# Patient Record
Sex: MALE | Race: WHITE | NOT HISPANIC OR LATINO
[De-identification: names, ages, dates, MRNs, and addresses within clinical notes are randomized per-mention and may not be internally consistent; named-entity substitution may affect disease eponyms.]

---

## 2019-07-10 ENCOUNTER — RX RENEWAL (OUTPATIENT)
Age: 49
End: 2019-07-10

## 2019-07-10 DIAGNOSIS — G47.00 INSOMNIA, UNSPECIFIED: ICD-10-CM

## 2019-07-10 PROBLEM — Z00.00 ENCOUNTER FOR PREVENTIVE HEALTH EXAMINATION: Status: ACTIVE | Noted: 2019-07-10

## 2019-09-09 ENCOUNTER — RX RENEWAL (OUTPATIENT)
Age: 49
End: 2019-09-09

## 2020-05-12 RX ORDER — CHLOROQUINE PHOSPHATE 500 MG/1
500 TABLET ORAL
Qty: 20 | Refills: 0 | Status: DISCONTINUED | COMMUNITY
Start: 2020-03-18 | End: 2020-05-12

## 2020-05-13 ENCOUNTER — APPOINTMENT (OUTPATIENT)
Dept: ORTHOPEDIC SURGERY | Facility: CLINIC | Age: 50
End: 2020-05-13
Payer: COMMERCIAL

## 2020-05-13 PROCEDURE — 76882 US LMTD JT/FCL EVL NVASC XTR: CPT | Mod: LT,59

## 2020-05-13 PROCEDURE — 20611 DRAIN/INJ JOINT/BURSA W/US: CPT | Mod: LT

## 2020-05-13 PROCEDURE — 99204 OFFICE O/P NEW MOD 45 MIN: CPT | Mod: 25

## 2020-05-13 NOTE — PHYSICAL EXAM
[de-identified] : PHYSICAL EXAM LEFT  SHOULDER\par \par MILD SCAPULAR PROTRACTION\par AROM 140 / 140 / 80 / 20 \par TENDER: SA REGION LATERALLY \par \par SPECIAL TESTING :\par SOTO - POSITIVE \par GILBERTO - POSITIVE \par SPEED TEST - POSITIVE\par \par RAM - NEGATIVE \par APPREHENSION AND SUPPRESSION - NEGATIVE \par \par RC STRENGTH TESTING \par SS:  5/5\par SUB 5/5\par IS     5/5\par BICEPS  5/5\par \par SENSATION  - GROSSLY INTACT\par \par \par \par  [de-identified] : LEFT SHOULDER XRAY (2 VIEWS - AP AND OUTLET) -  \par NO OBVIOUS FRACTURE , SEPARATION OR DISLOCATION \par NO SIGNIFICANT OSTEOARTHRITIS, \par TYPE 2B LOWLYING ACROMION \par CSA= 33.5

## 2020-05-13 NOTE — DISCUSSION/SUMMARY
[de-identified] : POST INJECTION INSTRUCTIONS\par \par COLD THERAPY , ANALGESICS PRN\par \par HOME STRETCHING AND EXERCISES QD\par \par START P.T.  2 WEEKS AFTER INJECTION \par \par MRI IF NO RELIEF\par

## 2020-05-13 NOTE — HISTORY OF PRESENT ILLNESS
[de-identified] : LEFT SHOULDER\par MORE THEN 6 MONTHS \par PAIN LEVEL 7/10\par INTERMITTENT PAIN\par TWO PREVIOUS CORTISONE INJECTIONS HELPFUL\par MOST RECENT JANUARY 2020 \par NO PT YET

## 2020-05-13 NOTE — PROCEDURE
[de-identified] : DIAGNOSTIC ULTRASOUND LEFT SHOULDER\par \par DIAGNOSTIC SONOGRAPHY of the Rotator Cuff Soft Tissue of the LEFT  SHOULDER was performed in Multiple Scan Planes with varying transducer frequencies.\par Imaging of the Supraspinatus Tendon reveals TENDONITIS, BURSITIS, ARTICULAR SIDE DEGENERATION  WITH OUT SIGNIFICANT / COMPLETE TEAR\par Imaging of the Biceps Tendon reveals no significant tear.\par Imaging of the Subscapularis Tendon reveals no significant tear.\par Imaging of the Infraspinatus Tendon reveals no significant tear.\par Key images were save digitally and reviewed with patient.\par \par \par INJECTION LEFT SHOULDER SA SPACE\par \par Patient has demonstrated limited relief from NSAIDS, rest, exercises / PT, and after discussion of the risks and benefits, the patient has elected to proceed with an ULTRASOUND GUIDED injection into the LEFT SUBACROMIAL  SPACE LATERAL APPROACH \par  \par Confirmed that the patient does not have history of prior adverse reactions, active, infections, or relevant allergies. There was no effusion, erythema, or warmth, and the skin was clear\par \par The skin was sterilized with alcohol. Ethyl Chloride was used as a topical anesthetic. Routine sterile technique. \par The site was injected UTILIZING ULTRASOUND GUIDANCE to confirm appropriate placement of the needle-\par with a mixture of medication and local anesthetic. The injection was completed without complication and a bandage was applied.\par  \par The patient tolerated the procedure well and was given post-injection instructions.Rec: Cold therapy, analgesics, avoid heavy activity.\par MEDICATION: 4cc of 1% xylocaine + 40mg of KENALOG\par \par

## 2020-10-01 ENCOUNTER — APPOINTMENT (OUTPATIENT)
Dept: ORTHOPEDIC SURGERY | Facility: CLINIC | Age: 50
End: 2020-10-01
Payer: COMMERCIAL

## 2020-10-01 DIAGNOSIS — M75.112 INCOMPLETE ROTATOR CUFF TEAR OR RUPTURE OF LEFT SHOULDER, NOT SPECIFIED AS TRAUMATIC: ICD-10-CM

## 2020-10-01 PROCEDURE — 99214 OFFICE O/P EST MOD 30 MIN: CPT | Mod: 25

## 2020-10-01 PROCEDURE — 20611 DRAIN/INJ JOINT/BURSA W/US: CPT | Mod: LT

## 2020-10-01 NOTE — PHYSICAL EXAM
[de-identified] : PHYSICAL EXAM LEFT  SHOULDER\par \par MILD SCAPULAR PROTRACTION\par AROM 140 / 140 / 80 / 20 \par TENDER: SA REGION LATERALLY \par \par SPECIAL TESTING :\par SOTO - POSITIVE \par GILBERTO - POSITIVE \par SPEED TEST - POSITIVE\par \par RAM - NEGATIVE \par APPREHENSION AND SUPPRESSION - NEGATIVE \par \par RC STRENGTH TESTING \par SS:  5/5\par SUB 5/5\par IS     5/5\par BICEPS  5/5\par \par SENSATION  - GROSSLY INTACT\par \par \par \par

## 2020-10-01 NOTE — DISCUSSION/SUMMARY
[de-identified] : POST INJECTION INSTRUCTIONS\par \par COLD THERAPY ,TYLENOL  PRN - AVOID NSAIDS 14 DAYS \par \par HOME STRETCHING AND EXERCISES QD\par \par START P.T.  2 WEEKS AFTER INJECTION \par \par NO GYM 2 WEEKS \par \par \par

## 2020-10-01 NOTE — PROCEDURE
[de-identified] : INJECTION LEFT SHOULDER PARTIAL ROT CUFF TEAR \par \par Patient has demonstrated limited relief from NSAIDS, rest, exercises / PT, and after discussion of the risks and benefits, the patient has elected to proceed with an ULTRASOUND GUIDED injection into the LEFT SHOULDER PARTIAL ROT CUFF TEAR  \par  \par Confirmed that the patient does not have history of prior adverse reactions, active, infections, or relevant allergies. There was no effusion, erythema, or warmth, and the skin was clear\par \par The skin was sterilized with alcohol. Ethyl Chloride was used as a topical anesthetic. Routine sterile technique. \par The site was injected UTILIZING ULTRASOUND GUIDANCE to confirm appropriate placement of the needle IN AND AROUND THE PARTIAL ROTATOR CUFF TEAR -with ARTHREX ISIDRO PRP 5CC. The injection was completed without complication and a bandage was applied.\par  \par The patient tolerated the procedure well and was given post-injection instructions.Rec: Cold therapy, analgesics, avoid heavy activity.\par MEDICATION :ARTHREX ISIDRO PRP 5CC\par \par

## 2020-10-01 NOTE — HISTORY OF PRESENT ILLNESS
[de-identified] : LEFT SHOULDER\par FOLLOW UP\par PAIN LEVEL 8/10\par PREVIOUS CORTISONE INJECTION AUGUST 16  HELPFUL FOR A FEW WEEKS\par USING TYLENOL (NO NSAIDS) FOR PAIN SINCE LAST WEDNESDAY

## 2020-12-01 RX ORDER — HYDROMORPHONE HYDROCHLORIDE 4 MG/1
4 TABLET ORAL
Qty: 28 | Refills: 0 | Status: DISCONTINUED | COMMUNITY
Start: 2020-11-29 | End: 2020-12-01

## 2021-01-14 ENCOUNTER — TRANSCRIPTION ENCOUNTER (OUTPATIENT)
Age: 51
End: 2021-01-14

## 2021-02-21 RX ORDER — HYDROMORPHONE HYDROCHLORIDE 2 MG/1
2 TABLET ORAL EVERY 4 HOURS
Qty: 50 | Refills: 0 | Status: DISCONTINUED | COMMUNITY
Start: 2020-12-01 | End: 2021-02-21

## 2021-02-21 RX ORDER — GABAPENTIN 300 MG/1
300 CAPSULE ORAL 3 TIMES DAILY
Qty: 45 | Refills: 2 | Status: DISCONTINUED | COMMUNITY
Start: 2020-12-20 | End: 2021-02-21

## 2021-02-21 RX ORDER — KETOROLAC TROMETHAMINE 10 MG/1
10 TABLET, FILM COATED ORAL EVERY 6 HOURS
Qty: 20 | Refills: 0 | Status: DISCONTINUED | COMMUNITY
Start: 2020-12-20 | End: 2021-02-21

## 2021-02-21 RX ORDER — TRAMADOL HYDROCHLORIDE 50 MG/1
50 TABLET, COATED ORAL
Qty: 90 | Refills: 0 | Status: DISCONTINUED | COMMUNITY
Start: 2020-11-25 | End: 2021-02-21

## 2021-08-26 RX ORDER — MUPIROCIN 20 MG/G
2 OINTMENT TOPICAL TWICE DAILY
Qty: 1 | Refills: 3 | Status: DISCONTINUED | COMMUNITY
Start: 2021-03-22 | End: 2021-08-26

## 2021-08-26 RX ORDER — TRAMADOL HYDROCHLORIDE 50 MG/1
50 TABLET, COATED ORAL
Qty: 60 | Refills: 0 | Status: DISCONTINUED | COMMUNITY
Start: 2021-03-09 | End: 2021-08-26

## 2021-08-26 RX ORDER — PRAMOXINE HYDROCHLORIDE AND HYDROCORTISONE ACETATE 10; 25 MG/G; MG/G
2.5-1 CREAM TOPICAL
Qty: 1 | Refills: 5 | Status: DISCONTINUED | COMMUNITY
Start: 2019-07-10 | End: 2021-08-26

## 2021-08-26 RX ORDER — GENTAMICIN SULFATE 1 MG/G
0.1 CREAM TOPICAL TWICE DAILY
Qty: 1 | Refills: 5 | Status: DISCONTINUED | COMMUNITY
Start: 2019-07-10 | End: 2021-08-26

## 2021-09-24 RX ORDER — IBUPROFEN AND FAMOTIDINE 800; 26.6 MG/1; MG/1
800-26.6 TABLET, COATED ORAL
Qty: 90 | Refills: 3 | Status: DISCONTINUED | COMMUNITY
Start: 2020-10-19 | End: 2021-09-24

## 2021-09-24 RX ORDER — IBUPROFEN 800 MG/1
800 TABLET ORAL 4 TIMES DAILY
Qty: 120 | Refills: 3 | Status: DISCONTINUED | COMMUNITY
Start: 2020-05-12 | End: 2021-09-24

## 2021-10-25 DIAGNOSIS — Z87.09 PERSONAL HISTORY OF OTHER DISEASES OF THE RESPIRATORY SYSTEM: ICD-10-CM

## 2021-10-25 RX ORDER — ALPRAZOLAM 0.5 MG/1
0.5 TABLET ORAL 3 TIMES DAILY
Qty: 45 | Refills: 0 | Status: DISCONTINUED | COMMUNITY
Start: 2021-10-12 | End: 2021-10-25

## 2021-10-27 ENCOUNTER — APPOINTMENT (OUTPATIENT)
Dept: HEART AND VASCULAR | Facility: CLINIC | Age: 51
End: 2021-10-27
Payer: COMMERCIAL

## 2021-10-27 VITALS
BODY MASS INDEX: 28.98 KG/M2 | DIASTOLIC BLOOD PRESSURE: 60 MMHG | SYSTOLIC BLOOD PRESSURE: 129 MMHG | HEART RATE: 72 BPM | WEIGHT: 207 LBS | HEIGHT: 71 IN | OXYGEN SATURATION: 95 %

## 2021-10-27 DIAGNOSIS — R00.2 PALPITATIONS: ICD-10-CM

## 2021-10-27 PROCEDURE — 99214 OFFICE O/P EST MOD 30 MIN: CPT

## 2021-10-27 PROCEDURE — 93000 ELECTROCARDIOGRAM COMPLETE: CPT

## 2021-11-08 RX ORDER — LORAZEPAM 1 MG/1
1 TABLET ORAL 3 TIMES DAILY
Qty: 45 | Refills: 0 | Status: DISCONTINUED | COMMUNITY
Start: 2021-10-25 | End: 2021-11-08

## 2022-01-18 ENCOUNTER — FORM ENCOUNTER (OUTPATIENT)
Age: 52
End: 2022-01-18

## 2022-01-19 ENCOUNTER — OUTPATIENT (OUTPATIENT)
Dept: OUTPATIENT SERVICES | Facility: HOSPITAL | Age: 52
LOS: 1 days | End: 2022-01-19
Payer: COMMERCIAL

## 2022-01-19 DIAGNOSIS — R00.2 PALPITATIONS: ICD-10-CM

## 2022-01-19 PROCEDURE — 93306 TTE W/DOPPLER COMPLETE: CPT

## 2022-01-19 PROCEDURE — 93306 TTE W/DOPPLER COMPLETE: CPT | Mod: 26

## 2022-01-31 ENCOUNTER — RX RENEWAL (OUTPATIENT)
Age: 52
End: 2022-01-31

## 2023-04-27 RX ORDER — ZOLPIDEM TARTRATE 10 MG/1
10 TABLET ORAL
Qty: 30 | Refills: 0 | Status: DISCONTINUED | COMMUNITY
Start: 2019-07-10 | End: 2023-04-27

## 2023-04-27 RX ORDER — OXYCODONE AND ACETAMINOPHEN 7.5; 325 MG/1; MG/1
7.5-325 TABLET ORAL
Qty: 42 | Refills: 0 | Status: DISCONTINUED | COMMUNITY
Start: 2022-09-09 | End: 2023-04-27

## 2023-04-27 RX ORDER — ZOLPIDEM TARTRATE 10 MG/1
10 TABLET ORAL
Qty: 30 | Refills: 0 | Status: DISCONTINUED | COMMUNITY
Start: 2021-01-11 | End: 2023-04-27

## 2023-04-27 RX ORDER — SEMAGLUTIDE 1.34 MG/ML
2 INJECTION, SOLUTION SUBCUTANEOUS
Qty: 1 | Refills: 0 | Status: DISCONTINUED | COMMUNITY
Start: 2022-06-11 | End: 2023-04-27

## 2023-04-27 RX ORDER — FINASTERIDE 1 MG/1
1 TABLET ORAL
Qty: 30 | Refills: 5 | Status: DISCONTINUED | COMMUNITY
Start: 2022-01-08 | End: 2023-04-27

## 2023-04-27 RX ORDER — ZOLPIDEM TARTRATE 10 MG/1
10 TABLET ORAL
Qty: 30 | Refills: 0 | Status: DISCONTINUED | COMMUNITY
Start: 2021-08-26 | End: 2023-04-27

## 2023-04-27 RX ORDER — ZOLPIDEM TARTRATE 10 MG/1
10 TABLET ORAL
Qty: 30 | Refills: 0 | Status: DISCONTINUED | COMMUNITY
Start: 2020-02-08 | End: 2023-04-27

## 2023-04-27 RX ORDER — AMLODIPINE BESYLATE 10 MG/1
10 TABLET ORAL DAILY
Qty: 90 | Refills: 3 | Status: DISCONTINUED | COMMUNITY
Start: 2021-11-01 | End: 2023-04-27

## 2023-04-27 RX ORDER — AMLODIPINE AND VALSARTAN 5; 320 MG/1; MG/1
5-320 TABLET, FILM COATED ORAL DAILY
Qty: 30 | Refills: 2 | Status: DISCONTINUED | COMMUNITY
Start: 2021-07-08 | End: 2023-04-27

## 2023-04-27 RX ORDER — AZITHROMYCIN 250 MG/1
250 TABLET, FILM COATED ORAL
Qty: 1 | Refills: 0 | Status: DISCONTINUED | COMMUNITY
Start: 2022-11-03 | End: 2023-04-27

## 2023-06-12 DIAGNOSIS — Z78.9 OTHER SPECIFIED HEALTH STATUS: ICD-10-CM

## 2023-06-12 DIAGNOSIS — M75.42 IMPINGEMENT SYNDROME OF LEFT SHOULDER: ICD-10-CM

## 2023-06-12 DIAGNOSIS — M75.82 OTHER SHOULDER LESIONS, LEFT SHOULDER: ICD-10-CM

## 2023-06-12 DIAGNOSIS — M75.81 OTHER SHOULDER LESIONS, RIGHT SHOULDER: ICD-10-CM

## 2023-09-05 ENCOUNTER — NON-APPOINTMENT (OUTPATIENT)
Age: 53
End: 2023-09-05

## 2023-09-05 ENCOUNTER — APPOINTMENT (OUTPATIENT)
Dept: ORTHOPEDIC SURGERY | Facility: CLINIC | Age: 53
End: 2023-09-05
Payer: COMMERCIAL

## 2023-09-05 PROCEDURE — 20611 DRAIN/INJ JOINT/BURSA W/US: CPT | Mod: NC,RT

## 2023-09-05 PROCEDURE — 99203 OFFICE O/P NEW LOW 30 MIN: CPT | Mod: NC

## 2023-09-05 NOTE — PHYSICAL EXAM
[de-identified] : PHYSICAL EXAM  RIGHT  SHOULDER  NECK EXAM  FROM NONTENDER  SPURLING  RIGHT=NEG, LEFT=NEG  NORMAL POSTURE AROM 150 / 150 / 80 / 20  TENDER: SA REGION ANTERIOR AND LATERAL   SPECIAL TESTING: SOTO - POSITIVE  GILBERTO - POSITIVE  SPEED TEST - POSITIVE  RAM - NEGATIVE  APPREHENSION AND SUPPRESSION - NEGATIVE   RC STRENGTH TESTING  SS:  5/5 SUB 5/5 IS     5/5 BICEPS  5/5  SENSATION  - GROSSLY INTACT    [de-identified] : RIGHT SHOULDER XRAY (2 VIEWS - AP AND OUTLET) -   NO OBVIOUS FRACTURE ,  SEPARATION OR DISLOCATION  NO SIGNIFICANT OSTEOARTHRITIS, TYPE 3B ACROMION + SPUR  CSA= 35.9  + SPUR

## 2023-09-05 NOTE — PROCEDURE
[de-identified] : INJECTION RIGHT SHOULDER SA SPACE  Patient has demonstrated limited relief from NSAIDS, rest, exercises / PT, and after discussion of the risks and benefits, the patient has elected to proceed with an ULTRASOUND GUIDED injection into the RIGHT SUBACROMIAL  SPACE LATERAL APPROACH    Confirmed that the patient does not have history of prior adverse reactions, active, infections, or relevant allergies. There was no effusion, erythema, or warmth, and the skin was clear  The skin was sterilized with alcohol. Ethyl Chloride was used as a topical anesthetic. Routine sterile technique.  The site was injected UTILIZING ULTRASOUND GUIDANCE to confirm appropriate placement of the needle- with a mixture of medication and local anesthetic. The injection was completed without complication and a bandage was applied.   The patient tolerated the procedure well and was given post-injection instructions.Rec: Cold therapy, analgesics, avoid heavy activity. MEDICATION: 4cc of 1% xylocaine + 40mg of KENALOG LOT # [id number]  EXP 07/24

## 2023-09-05 NOTE — DISCUSSION/SUMMARY
[de-identified] : ULTRASOUND EVALUATION  REVEALS INFLAMMATORY CHANGES WITHOUT SIGNIFICANT TEAR  PATIENT HAS ELECTED TO PROCEED WITH KENALOG INJECTION SHOULDER  RISKS AND BENEFITS DISCUSSED - VERBAL CONSENT OBTAINED  SEE PROCEDURE NOTE   POST INJECTION INSTRUCTIONS:  INJECTION THERAPY HANDOUT PROVIDED  COLD THERAPY , ANALGESICS PRN  HOME  EXERCISES QD - PENDULUM AND ROM  HANDOUT PROVIDED, REVIEWED AND DEMONSTRATED - REFERRED TO INSTRUCTIONAL VIDEO ON MY WEBSITE  START P.T.  WITHIN 2 WEEKS AFTER INJECTION - 2 X 4 WEEKS   MRI IF NO RELIEF 12 WEEKS

## 2023-09-05 NOTE — HISTORY OF PRESENT ILLNESS
[de-identified] : LOCATION :RIGHT SHOULDER PAIN  DURATION:1 MONTH AGO   NO SPECIFIC INJURY RADIATING PAIN DOWN ARM   INTERMITTENT PAIN LEVEL:7/10 BETTER WITH REST, ALEVE

## 2023-10-16 RX ORDER — HYDROCORTISONE ACETATE, PRAMOXINE HCL 2.5; 1 G/100G; G/100G
2.5-1 CREAM TOPICAL 3 TIMES DAILY
Qty: 2 | Refills: 5 | Status: DISCONTINUED | COMMUNITY
Start: 2022-05-17 | End: 2023-10-16

## 2023-10-16 RX ORDER — CEPHALEXIN 500 MG/1
500 TABLET ORAL EVERY 8 HOURS
Qty: 42 | Refills: 0 | Status: DISCONTINUED | COMMUNITY
Start: 2023-06-28 | End: 2023-10-16

## 2023-10-16 RX ORDER — SEMAGLUTIDE 1.34 MG/ML
2 INJECTION, SOLUTION SUBCUTANEOUS
Qty: 1 | Refills: 0 | Status: DISCONTINUED | COMMUNITY
Start: 2022-03-15 | End: 2023-10-16

## 2023-10-16 RX ORDER — AZITHROMYCIN 250 MG/1
250 TABLET, FILM COATED ORAL
Qty: 6 | Refills: 0 | Status: DISCONTINUED | COMMUNITY
Start: 2023-07-09 | End: 2023-10-16

## 2023-10-16 RX ORDER — MODAFINIL 100 MG/1
100 TABLET ORAL DAILY
Qty: 30 | Refills: 0 | Status: DISCONTINUED | COMMUNITY
Start: 2023-06-12 | End: 2023-10-16

## 2023-10-16 RX ORDER — CLINDAMYCIN HYDROCHLORIDE 300 MG/1
300 CAPSULE ORAL TWICE DAILY
Qty: 10 | Refills: 0 | Status: DISCONTINUED | COMMUNITY
Start: 2023-06-02 | End: 2023-10-16

## 2023-10-23 ENCOUNTER — APPOINTMENT (OUTPATIENT)
Dept: ORTHOPEDIC SURGERY | Facility: CLINIC | Age: 53
End: 2023-10-23
Payer: COMMERCIAL

## 2023-10-23 DIAGNOSIS — S46.001A UNSPECIFIED INJURY OF MUSCLE(S) AND TENDON(S) OF THE ROTATOR CUFF OF RIGHT SHOULDER, INITIAL ENCOUNTER: ICD-10-CM

## 2023-10-23 DIAGNOSIS — M75.41 IMPINGEMENT SYNDROME OF RIGHT SHOULDER: ICD-10-CM

## 2023-10-23 PROCEDURE — 99213 OFFICE O/P EST LOW 20 MIN: CPT | Mod: 95

## 2023-10-23 RX ORDER — IBUPROFEN 800 MG/1
800 TABLET ORAL 3 TIMES DAILY
Qty: 90 | Refills: 5 | Status: ACTIVE | COMMUNITY
Start: 2022-09-09 | End: 1900-01-01

## 2023-10-23 RX ORDER — ALPRAZOLAM 1 MG/1
1 TABLET ORAL TWICE DAILY
Qty: 20 | Refills: 0 | Status: DISCONTINUED | COMMUNITY
Start: 2023-10-16 | End: 2023-10-23

## 2023-11-15 RX ORDER — TIRZEPATIDE 7.5 MG/.5ML
7.5 INJECTION, SOLUTION SUBCUTANEOUS WEEKLY
Qty: 1 | Refills: 3 | Status: ACTIVE | COMMUNITY
Start: 2023-11-15 | End: 1900-01-01

## 2023-11-29 ENCOUNTER — APPOINTMENT (OUTPATIENT)
Dept: ORTHOPEDIC SURGERY | Facility: CLINIC | Age: 53
End: 2023-11-29
Payer: COMMERCIAL

## 2023-11-29 VITALS
OXYGEN SATURATION: 96 % | RESPIRATION RATE: 16 BRPM | BODY MASS INDEX: 28.98 KG/M2 | WEIGHT: 207 LBS | HEIGHT: 71 IN | HEART RATE: 69 BPM | DIASTOLIC BLOOD PRESSURE: 82 MMHG | SYSTOLIC BLOOD PRESSURE: 126 MMHG

## 2023-11-29 DIAGNOSIS — M67.813 OTHER SPECIFIED DISORDERS OF TENDON, RIGHT SHOULDER: ICD-10-CM

## 2023-11-29 DIAGNOSIS — Z78.9 OTHER SPECIFIED HEALTH STATUS: ICD-10-CM

## 2023-11-29 DIAGNOSIS — M75.121 COMPLETE ROTATOR CUFF TEAR OR RUPTURE OF RIGHT SHOULDER, NOT SPECIFIED AS TRAUMATIC: ICD-10-CM

## 2023-11-29 DIAGNOSIS — S43.431A SUPERIOR GLENOID LABRUM LESION OF RIGHT SHOULDER, INITIAL ENCOUNTER: ICD-10-CM

## 2023-11-29 DIAGNOSIS — S43.003A UNSPECIFIED SUBLUXATION OF UNSPECIFIED SHOULDER JOINT, INITIAL ENCOUNTER: ICD-10-CM

## 2023-11-29 PROCEDURE — 99214 OFFICE O/P EST MOD 30 MIN: CPT

## 2023-12-06 RX ORDER — AMLODIPINE AND VALSARTAN 5; 320 MG/1; MG/1
5-320 TABLET, FILM COATED ORAL
Qty: 30 | Refills: 5 | Status: ACTIVE | COMMUNITY
Start: 2023-01-19 | End: 1900-01-01

## 2023-12-10 ENCOUNTER — NON-APPOINTMENT (OUTPATIENT)
Age: 53
End: 2023-12-10

## 2024-01-25 ENCOUNTER — APPOINTMENT (OUTPATIENT)
Dept: ORTHOPEDIC SURGERY | Facility: CLINIC | Age: 54
End: 2024-01-25

## 2024-01-28 RX ORDER — OXYCODONE AND ACETAMINOPHEN 5; 325 MG/1; MG/1
5-325 TABLET ORAL
Qty: 28 | Refills: 0 | Status: DISCONTINUED | COMMUNITY
Start: 2023-12-08 | End: 2024-01-28

## 2024-02-15 ENCOUNTER — APPOINTMENT (OUTPATIENT)
Dept: ORTHOPEDIC SURGERY | Facility: CLINIC | Age: 54
End: 2024-02-15
Payer: COMMERCIAL

## 2024-02-15 DIAGNOSIS — M96.89 OTHER INTRAOPERATIVE AND POSTPROCEDURAL COMPLICATIONS AND DISORDERS OF THE MUSCULOSKELETAL SYSTEM: ICD-10-CM

## 2024-02-15 DIAGNOSIS — S46.011D STRAIN OF MUSCLE(S) AND TENDON(S) OF THE ROTATOR CUFF OF RIGHT SHOULDER, SUBSEQUENT ENCOUNTER: ICD-10-CM

## 2024-02-15 PROCEDURE — 99213 OFFICE O/P EST LOW 20 MIN: CPT

## 2024-02-15 NOTE — HISTORY OF PRESENT ILLNESS
[de-identified] : TELEHEALTH VISIT      DECEMBER 6, 2023 - RIGHT 2.5 CM RC REPAIR , CYNTHIA, SARAI AND BICEPS TENODESIS  - DR RANGEL [facility]  53-YEAR-OLD LHD GENTLEMAN PRESENTS TODAY FOR SECOND OPINION EVALUATION RIGHT SHOULDER POST SURGERY  PATIENT UNDERWENT ROTATOR CUFF REPAIR, DECOMPRESSION DISTAL CLAVICLE RESECTION AND BICEPS TENODESIS DECEMBER 6 BY DR. RANGEL AT [facility] DECEMBER 6, 2023  PATIENT WAS IN A SLING FOR 3 TO 4 WEEKS AND HAS BEEN DOING THERAPY SINCE THEN  AT SOME POINT APPROXIMATELY 5 OR  6 WEEKS POST SURGERY HE HAD DRAMATIC INCREASE IN PAIN AFTER PHYSICAL THERAPY VISIT.  SINCE THAT TIME HE HAS HAD CONSISTENT PAIN WITHOUT RELIEF.  PATIENT HAS HAD 1 INJECTION POSTOPERATIVELY FROM DR. RANGEL WHICH PROVIDED TEMPORARY RELIEF  PATIENT CURRENTLY TAKING TYLENOL AND MELOXICAM AS ANALGESICS  CONSTANT PAIN , WORSE WITH LIFTING - NO STRENGTH

## 2024-02-15 NOTE — PHYSICAL EXAM
[de-identified] : RIGHT SHODULER - VIA VIDEO NO SWELLING ERYTHEMA OR CALOR IS NOTICED.   PERSISTING PAIN DESCRIBED  AT THE AC JOINT BICEPS GROOVE AND LATERAL SHOULDER   PATIENT DEMONSTRATES  ACTIVE RANGE OF MOTION 120 / 120 .   POSITIVE IMPINGEMENT SIGNS  ROTATOR CUFF STRENGTH WEAK ACCORDING TO PATIENT. UNABLE TO LIFT ANYTHING HEAVIER THAN A COFFEE MUG

## 2024-02-15 NOTE — DISCUSSION/SUMMARY
[de-identified] : DECEMBER 6, 2023 - RIGHT 2.5 CM RC REPAIR , SARAI MARIE AND BICEPS TENODESIS  - DR RANGEL [facility]  PATIENT HAS BEEN PARTICIPATING PHYSICAL THERAPY FOR 2 MONTHS AFTER SURGERY AND  NOW HAS WORSENING PAIN WITHOUT RESOLUTION.   THERE SEEMS TO HAVE BEEN A DISCRETE INCREASE IN PAIN AND WEAKNESS 5 TO 6 WEEKS POSTOP   I HAVE REQUESTED AUTHORIZATION FOR MRI RIGHT SHOULDER EVALUATE THE INTEGRITY OF THE ROTATOR CUFF REPAIR BICEPS TENODESIS - POSSIBLE RECURRENT ROTATOR CUFF TEAR

## 2024-02-17 RX ORDER — MELOXICAM 15 MG/1
15 TABLET ORAL DAILY
Qty: 30 | Refills: 1 | Status: DISCONTINUED | COMMUNITY
Start: 2024-01-28 | End: 2024-02-17

## 2024-03-13 RX ORDER — TIRZEPATIDE 10 MG/.5ML
10 INJECTION, SOLUTION SUBCUTANEOUS
Qty: 1 | Refills: 0 | Status: DISCONTINUED | COMMUNITY
Start: 2024-02-27 | End: 2024-03-13

## 2024-03-13 RX ORDER — KETOROLAC TROMETHAMINE 10 MG/1
10 TABLET, FILM COATED ORAL EVERY 6 HOURS
Qty: 28 | Refills: 0 | Status: DISCONTINUED | COMMUNITY
Start: 2024-03-07 | End: 2024-03-13

## 2024-03-13 RX ORDER — TRAMADOL HYDROCHLORIDE 50 MG/1
50 TABLET, COATED ORAL
Qty: 60 | Refills: 0 | Status: DISCONTINUED | COMMUNITY
Start: 2024-02-28 | End: 2024-03-13

## 2024-03-14 RX ORDER — ZOLPIDEM TARTRATE 10 MG/1
10 TABLET ORAL
Qty: 30 | Refills: 0 | Status: ACTIVE | COMMUNITY
Start: 2024-01-29 | End: 1900-01-01

## 2024-03-20 DIAGNOSIS — M00.9 PYOGENIC ARTHRITIS, UNSPECIFIED: ICD-10-CM

## 2024-03-21 ENCOUNTER — RESULT REVIEW (OUTPATIENT)
Age: 54
End: 2024-03-21

## 2024-03-22 ENCOUNTER — APPOINTMENT (OUTPATIENT)
Dept: INTERVENTIONAL RADIOLOGY/VASCULAR | Facility: HOSPITAL | Age: 54
End: 2024-03-22
Payer: COMMERCIAL

## 2024-03-22 ENCOUNTER — APPOINTMENT (OUTPATIENT)
Dept: INFECTIOUS DISEASE | Facility: CLINIC | Age: 54
End: 2024-03-22
Payer: COMMERCIAL

## 2024-03-22 ENCOUNTER — OUTPATIENT (OUTPATIENT)
Dept: OUTPATIENT SERVICES | Facility: HOSPITAL | Age: 54
LOS: 1 days | End: 2024-03-22
Payer: COMMERCIAL

## 2024-03-22 VITALS
WEIGHT: 188 LBS | BODY MASS INDEX: 26.32 KG/M2 | SYSTOLIC BLOOD PRESSURE: 127 MMHG | DIASTOLIC BLOOD PRESSURE: 76 MMHG | HEIGHT: 71 IN | TEMPERATURE: 97.7 F | OXYGEN SATURATION: 98 % | HEART RATE: 67 BPM

## 2024-03-22 PROCEDURE — C1751: CPT

## 2024-03-22 PROCEDURE — 36573 INSJ PICC RS&I 5 YR+: CPT

## 2024-03-22 PROCEDURE — 99205 OFFICE O/P NEW HI 60 MIN: CPT

## 2024-03-22 RX ORDER — METHYLPREDNISOLONE 4 MG/1
4 TABLET ORAL
Qty: 1 | Refills: 0 | Status: COMPLETED | COMMUNITY
Start: 2024-02-18 | End: 2024-03-22

## 2024-03-22 RX ORDER — OXYCODONE AND ACETAMINOPHEN 7.5; 325 MG/1; MG/1
7.5-325 TABLET ORAL
Qty: 42 | Refills: 0 | Status: COMPLETED | COMMUNITY
Start: 2024-03-13 | End: 2024-03-22

## 2024-03-22 RX ORDER — DOXYCYCLINE HYCLATE 100 MG/1
100 TABLET ORAL TWICE DAILY
Qty: 60 | Refills: 0 | Status: COMPLETED | COMMUNITY
Start: 2023-12-08 | End: 2024-03-22

## 2024-03-22 RX ORDER — DICLOFENAC SODIUM 75 MG/1
75 TABLET, DELAYED RELEASE ORAL TWICE DAILY
Qty: 60 | Refills: 4 | Status: COMPLETED | COMMUNITY
Start: 2024-02-17 | End: 2024-03-22

## 2024-03-22 RX ORDER — LUBIPROSTONE 24 UG/1
24 CAPSULE ORAL TWICE DAILY
Qty: 14 | Refills: 0 | Status: COMPLETED | COMMUNITY
Start: 2023-12-10 | End: 2024-03-22

## 2024-03-22 RX ORDER — CLOMIPHENE CITRATE 50 MG/1
50 TABLET ORAL DAILY
Qty: 15 | Refills: 0 | Status: COMPLETED | COMMUNITY
Start: 2024-02-23 | End: 2024-03-22

## 2024-03-22 NOTE — REVIEW OF SYSTEMS
[Fever] : no fever [Chills] : no chills [Feeling Sick] : not feeling sick [As Noted in HPI] : as noted in HPI [Negative] : Genitourinary

## 2024-03-22 NOTE — HISTORY OF PRESENT ILLNESS
[FreeTextEntry1] : 54M w/ hx L shoulder rotator cuff repair in 2020, and recent R shoulder arthroscopy for rotator cuff repair, sylvie procedure and biceps tenodesis (12/2023 at [facility]), subsequently developed worsening R shoulder pain starting ~5-6 weeks post-op, and had CSI of R shoulder for this issue but without improvement and repeat MRI from 2/20/2024 showed persistent rotator cuff tear and also fluid, adhesions and synovial thickening in/of R glenohumeral joint capsule concerning for infection, s/p washout at [facility] (3/14/24) with report of grossly infected synovial fluid, had removal of soft tissue anchors at that time, and OR cx with C.acnes in 5+ specimens. He has been on augmentin BID -> TID and is presenting to ID office for arrangement of OPAT.

## 2024-03-22 NOTE — ASSESSMENT
[FreeTextEntry1] : # R native shoulder septic arthritis complicating recent arthroscopy for rotator cuff repair/Hetal/biceps tenodesis 12/2023 - S/p washout at [facility] 3/14/24, OR cx x 5+ with C.acnes. Requested sensis for ceftriaxone today from [facility]- they will set up E-test. - PICC placement today. Working with [facility] to arrange home IV abx - Plan will be for ceftriaxone 2g IV q24h for 6 weeks from time of IV abx start. Pending clinical course may switch to PO after 4 weeks for a 2 week tail.  - Will request weekly CBC w/ diff, CMP, ESR, CRP faxed to [phone] - RTC 2 weeks TEB

## 2024-03-22 NOTE — PHYSICAL EXAM
[General Appearance - Alert] : alert [General Appearance - In No Acute Distress] : in no acute distress [Oropharynx] : the oropharynx was normal with no thrush [Sclera] : the sclera and conjunctiva were normal [Neck Appearance] : the appearance of the neck was normal [Exaggerated Use Of Accessory Muscles For Inspiration] : no accessory muscle use [Heart Rate And Rhythm] : heart rate was normal and rhythm regular [Abdomen Tenderness] : non-tender [Abdomen Soft] : soft [FreeTextEntry1] : R shoulder tenderness in joint line, and pain with active flexion/abduction beyond ~45 degrees. Less painful with passive ROM. Tender with axial loading. L shoulder exam normal. [] : no rash [Oriented To Time, Place, And Person] : oriented to person, place, and time

## 2024-03-23 RX ORDER — GABAPENTIN 100 MG/1
100 CAPSULE ORAL
Qty: 45 | Refills: 2 | Status: ACTIVE | COMMUNITY
Start: 2024-03-23 | End: 1900-01-01

## 2024-04-03 RX ORDER — ZOLPIDEM TARTRATE 10 MG/1
10 TABLET ORAL
Qty: 30 | Refills: 0 | Status: ACTIVE | COMMUNITY
Start: 2020-05-12 | End: 1900-01-01

## 2024-04-08 ENCOUNTER — APPOINTMENT (OUTPATIENT)
Dept: INFECTIOUS DISEASE | Facility: CLINIC | Age: 54
End: 2024-04-08

## 2024-04-18 RX ORDER — CEFTRIAXONE 2 G/1
2 INJECTION, POWDER, FOR SOLUTION INTRAMUSCULAR; INTRAVENOUS
Qty: 14 | Refills: 0 | Status: ACTIVE | OUTPATIENT
Start: 2024-04-16

## 2024-04-19 ENCOUNTER — APPOINTMENT (OUTPATIENT)
Dept: INFECTIOUS DISEASE | Facility: CLINIC | Age: 54
End: 2024-04-19
Payer: COMMERCIAL

## 2024-04-19 PROCEDURE — 99214 OFFICE O/P EST MOD 30 MIN: CPT

## 2024-05-03 ENCOUNTER — APPOINTMENT (OUTPATIENT)
Dept: INFECTIOUS DISEASE | Facility: CLINIC | Age: 54
End: 2024-05-03
Payer: COMMERCIAL

## 2024-05-03 PROCEDURE — 99214 OFFICE O/P EST MOD 30 MIN: CPT

## 2024-05-03 NOTE — HISTORY OF PRESENT ILLNESS
[FreeTextEntry1] : 54M w/ hx L shoulder rotator cuff repair in 2020, and recent R shoulder arthroscopy for rotator cuff repair, sylvie procedure and biceps tenodesis (12/2023 at [facility]), subsequently developed worsening R shoulder pain starting ~5-6 weeks post-op, and had CSI of R shoulder for this issue but without improvement and repeat MRI from 2/20/2024 showed persistent rotator cuff tear and also fluid, adhesions and synovial thickening in/of R glenohumeral joint capsule concerning for infection, s/p washout at [facility] (3/14/24) with report of grossly infected synovial fluid, had removal of soft tissue anchors at that time (except for one very small metal anchor in the biceps tendon which could not be removed), and OR cx with C.acnes in 5+ specimens. He was initially on augmentin BID -> TID and presented to our office for further management on 3/22. He had RUE PICC line placed on 3/22, and was started on ceftriaxone 2g IV q24h on 3/23 for planned 6 week course (EOT 5/4).  Patient has been doing well on antibiotics. He will finish abx tomorrow, and then PICC line will be removed. He is tolerating abx well. No nausea/vomiting/diarrhea/rash. No fever/chills/sweats. R shoulder pain/limited ROM persists (likely due mostly to rotator cuff tear), and he is working with PT.

## 2024-05-03 NOTE — REASON FOR VISIT
[Home] : at home, [unfilled] , at the time of the visit. [Medical Office: ([facility])___] : at the medical office located in  [Patient] : the patient [Self] : self [This encounter was initiated by telehealth (audio with video) and converted to telephone (audio only) due to technical difficulties.] : This encounter was initiated by telehealth (audio with video) and converted to telephone (audio only) due to technical difficulties. [Follow-Up: _____] : a [unfilled] follow-up visit

## 2024-05-03 NOTE — ASSESSMENT
[FreeTextEntry1] : # R native shoulder septic arthritis complicating recent arthroscopy for rotator cuff repair/Hetal/biceps tenodesis 12/2023 - S/p washout at [facility] 3/14/24 with removal of soft tissue anchors (except for one small metal anchor in biceps tendon, unable to be removed), OR cx x 5+ with C.acnes. Cefotaxime susceptible - On ceftriaxone 2g IV q24h x 6 weeks (3/23 - 5/4). PICC will be removed after that time. Plan then is for antibiotic free period x 6 weeks, then laparoscopic biopsy in early July and hold cultures for 2 weeks, and then if negative, surgery for definitive repair in August. - Last safety labs 4/23 normal, inflammatory markers normal. - RTC early 8/2024 TEB

## 2024-07-17 RX ORDER — PENTOXIFYLLINE 400 MG/1
400 TABLET, EXTENDED RELEASE ORAL 3 TIMES DAILY
Qty: 90 | Refills: 5 | Status: ACTIVE | COMMUNITY
Start: 2024-07-17 | End: 1900-01-01

## 2024-07-19 ENCOUNTER — APPOINTMENT (OUTPATIENT)
Dept: INFECTIOUS DISEASE | Facility: CLINIC | Age: 54
End: 2024-07-19
Payer: COMMERCIAL

## 2024-07-19 PROCEDURE — 99214 OFFICE O/P EST MOD 30 MIN: CPT

## 2024-08-21 ENCOUNTER — APPOINTMENT (OUTPATIENT)
Dept: INFECTIOUS DISEASE | Facility: CLINIC | Age: 54
End: 2024-08-21
Payer: COMMERCIAL

## 2024-08-21 PROCEDURE — 99213 OFFICE O/P EST LOW 20 MIN: CPT

## 2024-08-21 NOTE — ASSESSMENT
[FreeTextEntry1] : # R native shoulder septic arthritis complicating recent arthroscopy for rotator cuff repair/Hetal/biceps tenodesis 12/2023 - S/p washout at [facility] 3/14/24 with removal of soft tissue anchors (except for one small metal anchor in biceps tendon, unable to be removed), OR cx x 5+ with C.acnes. Cefotaxime susceptible. S/p 6 weeks of ceftriaxone (EOT 5/4). Biopsies off abx at end of 6/2024 negative x 5 at 14 days. - Requested repeat cultures be sent from definitive repair in 8/2024 and be held for 14 days. Patient/spouse will communicate this to the orthopedist. - RTC PRN if signs of infection on repeat surgery, or if any of the cultures turn positive.

## 2024-08-21 NOTE — HISTORY OF PRESENT ILLNESS
[Medical Office: ([facility])___] : at the medical office located in  [Home] : at home, [unfilled] , at the time of the visit. [Verbal consent obtained from patient] : the patient, [unfilled] [FreeTextEntry1] : 54M w/ hx L shoulder rotator cuff repair in 2020, and R shoulder arthroscopy for rotator cuff repair, sylvie procedure and biceps tenodesis (12/2023 at [facility]), subsequently developed worsening R shoulder pain starting ~5-6 weeks post-op, and had CSI of R shoulder for this issue but without improvement and repeat MRI from 2/20/2024 showed persistent rotator cuff tear and also fluid, adhesions and synovial thickening in/of R glenohumeral joint capsule concerning for infection, s/p washout at [facility] (3/14/24) with report of grossly infected synovial fluid, had removal of soft tissue anchors at that time (except for one very small metal anchor in the biceps tendon which could not be removed), and OR cx with C.acnes in 5+ specimens. He was initially on augmentin BID -> TID and presented to our office for further management on 3/22. He had RUE PICC line placed on 3/22, and was started on ceftriaxone 2g IV q24h on 3/23 for 6 week course (EOT 5/4). Then had shoulder biopsies off abx at end of 6/2024 which were culture negative x5 at 14 days. Plan is for revision shoulder surgery in 8/2024.  No new sx since last visit. No fever/chills/sweats. R shoulder limited ROM persists (likely due mostly to rotator cuff tear), pain much improved from prior. No swelling.

## 2024-08-24 RX ORDER — HYDROMORPHONE HYDROCHLORIDE 4 MG/1
4 TABLET ORAL
Qty: 28 | Refills: 0 | Status: ACTIVE | COMMUNITY
Start: 2024-08-24 | End: 1900-01-01

## 2024-09-10 RX ORDER — AMOXICILLIN 500 MG/1
500 CAPSULE ORAL 3 TIMES DAILY
Qty: 252 | Refills: 0 | Status: ACTIVE | COMMUNITY
Start: 2024-09-10 | End: 1900-01-01

## 2024-09-13 ENCOUNTER — APPOINTMENT (OUTPATIENT)
Dept: INFECTIOUS DISEASE | Facility: CLINIC | Age: 54
End: 2024-09-13

## 2024-09-17 NOTE — ASSESSMENT
[FreeTextEntry1] : # R native shoulder septic arthritis complicating recent arthroscopy for rotator cuff repair/Hetal/biceps tenodesis 12/2023 - S/p washout at [facility] 3/14/24 with removal of soft tissue anchors (except for one small metal anchor in biceps tendon, unable to be removed), OR cx x 5+ with C.acnes. Cefotaxime susceptible. S/p 6 weeks of ceftriaxone (EOT 5/4). Biopsies off abx at end of 6/2024 negative x 5 at 14 days. - Requested repeat cultures be sent from definitive repair in 8/2024 and be held for 14 days. Patient/spouse will communicate this to the orthopedist. - RTC PRN if signs of infection on repeat surgery, or if any of the cultures turn positive.

## 2024-09-17 NOTE — HISTORY OF PRESENT ILLNESS
[Home] : at home, [unfilled] , at the time of the visit. [Medical Office: ([facility])___] : at the medical office located in  [Verbal consent obtained from patient] : the patient, [unfilled] [FreeTextEntry1] : 54M w/ hx L shoulder rotator cuff repair in 2020, and R shoulder arthroscopy for rotator cuff repair, sylvie procedure and biceps tenodesis (12/2023 at [facility]), subsequently developed worsening R shoulder pain starting ~5-6 weeks post-op, and had CSI of R shoulder for this issue but without improvement and repeat MRI from 2/20/2024 showed persistent rotator cuff tear and also fluid, adhesions and synovial thickening in/of R glenohumeral joint capsule concerning for infection, s/p washout at [facility] (3/14/24) with report of grossly infected synovial fluid, had removal of soft tissue anchors at that time (except for one very small metal anchor in the biceps tendon which could not be removed), and OR cx with C.acnes in 5+ specimens. He was initially on augmentin BID -> TID and presented to our office for further management on 3/22. He had RUE PICC line placed on 3/22, and was started on ceftriaxone 2g IV q24h on 3/23 for 6 week course (EOT 5/4). Then had shoulder biopsies off abx at end of 6/2024 which were culture negative x5 at 14 days. Plan is for revision shoulder surgery in 8/2024.  No new sx since last visit. No fever/chills/sweats. R shoulder limited ROM persists (likely due mostly to rotator cuff tear), pain much improved from prior. No swelling.

## 2024-09-17 NOTE — HISTORY OF PRESENT ILLNESS
[Home] : at home, [unfilled] , at the time of the visit. [Medical Office: ([facility])___] : at the medical office located in  [Verbal consent obtained from patient] : the patient, [unfilled] [FreeTextEntry1] : 54M w/ hx L shoulder rotator cuff repair in 2020, and R shoulder arthroscopy for rotator cuff repair, sylvie procedure and biceps tenodesis (12/2023 at [facility]), subsequently developed worsening R shoulder pain starting ~5-6 weeks post-op, and had CSI of R shoulder for this issue but without improvement and repeat MRI from 2/20/2024 showed persistent rotator cuff tear and also fluid, adhesions and synovial thickening in/of R glenohumeral joint capsule concerning for infection, s/p washout at [facility] (3/14/24) with report of grossly infected synovial fluid, had removal of soft tissue anchors at that time (except for one very small metal anchor in the biceps tendon which could not be removed), and OR cx with C.acnes in 5+ specimens. He was initially on augmentin BID -> TID and presented to our office for further management on 3/22. He had RUE PICC line placed on 3/22, and was started on ceftriaxone 2g IV q24h on 3/23 for 6 week course (EOT 5/4). Then had shoulder biopsies off abx at end of 6/2024 which were culture negative x5 at 14 days. Plan is for revision shoulder surgery in 8/2024.  No new sx since last visit. No fever/chills/sweats. R shoulder limited ROM persists (likely due mostly to rotator cuff tear), pain much improved from prior. No swelling.

## 2024-09-17 NOTE — ASSESSMENT
[FreeTextEntry1] : # R native shoulder septic arthritis complicating recent arthroscopy for rotator cuff repair/Hetal/biceps tenodesis 12/2023 - S/p washout at [facility] 3/14/24 with removal of soft tissue anchors (except for one small metal anchor in biceps tendon, unable to be removed), OR cx x 5+ with C.acnes. Cefotaxime susceptible. S/p 6 weeks of ceftriaxone (EOT 5/4). Biopsies off abx at end of 6/2024 negative x 5 at 14 days. - Requested repeat cultures be sent from definitive repair in 8/2024 and be held for 14 days. Patient/spouse will communicate this to the orthopedist. - RTC PRN if signs of infection on repeat surgery, or if any of the cultures turn positive.

## 2024-10-16 ENCOUNTER — APPOINTMENT (OUTPATIENT)
Dept: INFECTIOUS DISEASE | Facility: CLINIC | Age: 54
End: 2024-10-16
Payer: COMMERCIAL

## 2024-10-16 PROCEDURE — 99214 OFFICE O/P EST MOD 30 MIN: CPT

## 2024-10-25 LAB
ALBUMIN SERPL ELPH-MCNC: 4.7 G/DL
ALP BLD-CCNC: 47 U/L
ALT SERPL-CCNC: 26 U/L
ANION GAP SERPL CALC-SCNC: 17 MMOL/L
AST SERPL-CCNC: 29 U/L
BASOPHILS # BLD AUTO: 0.03 K/UL
BASOPHILS NFR BLD AUTO: 0.5 %
BILIRUB SERPL-MCNC: 0.6 MG/DL
BUN SERPL-MCNC: 12 MG/DL
CALCIUM SERPL-MCNC: 10.3 MG/DL
CHLORIDE SERPL-SCNC: 98 MMOL/L
CO2 SERPL-SCNC: 23 MMOL/L
CREAT SERPL-MCNC: 1.03 MG/DL
EGFR: 86 ML/MIN/1.73M2
EOSINOPHIL # BLD AUTO: 0.11 K/UL
EOSINOPHIL NFR BLD AUTO: 1.8 %
ERYTHROCYTE [SEDIMENTATION RATE] IN BLOOD BY WESTERGREN METHOD: 6 MM/HR
GLUCOSE SERPL-MCNC: 88 MG/DL
HCT VFR BLD CALC: 44 %
HGB BLD-MCNC: 15.3 G/DL
IMM GRANULOCYTES NFR BLD AUTO: 0.3 %
LYMPHOCYTES # BLD AUTO: 1.86 K/UL
LYMPHOCYTES NFR BLD AUTO: 30.1 %
MAN DIFF?: NORMAL
MCHC RBC-ENTMCNC: 33.7 PG
MCHC RBC-ENTMCNC: 34.8 GM/DL
MCV RBC AUTO: 96.9 FL
MONOCYTES # BLD AUTO: 0.61 K/UL
MONOCYTES NFR BLD AUTO: 9.9 %
NEUTROPHILS # BLD AUTO: 3.55 K/UL
NEUTROPHILS NFR BLD AUTO: 57.4 %
PLATELET # BLD AUTO: 266 K/UL
POTASSIUM SERPL-SCNC: 4.6 MMOL/L
PROT SERPL-MCNC: 7.2 G/DL
RBC # BLD: 4.54 M/UL
RBC # FLD: 12.2 %
SODIUM SERPL-SCNC: 137 MMOL/L
WBC # FLD AUTO: 6.18 K/UL

## 2024-10-26 LAB — CRP SERPL-MCNC: <3 MG/L

## 2024-12-05 ENCOUNTER — APPOINTMENT (OUTPATIENT)
Dept: ORTHOPEDIC SURGERY | Age: 54
End: 2024-12-05
Payer: COMMERCIAL

## 2024-12-05 DIAGNOSIS — M72.0 PALMAR FASCIAL FIBROMATOSIS [DUPUYTREN]: ICD-10-CM

## 2024-12-05 PROCEDURE — 73130 X-RAY EXAM OF HAND: CPT | Mod: 50

## 2024-12-05 PROCEDURE — 99214 OFFICE O/P EST MOD 30 MIN: CPT

## 2024-12-05 PROCEDURE — 99213 OFFICE O/P EST LOW 20 MIN: CPT

## 2024-12-11 RX ORDER — ZOLPIDEM TARTRATE 10 MG/1
10 TABLET ORAL
Qty: 30 | Refills: 0 | Status: ACTIVE | COMMUNITY
Start: 2024-12-11 | End: 1900-01-01

## 2025-01-22 RX ORDER — CLOTRIMAZOLE AND BETAMETHASONE DIPROPIONATE 10; .5 MG/ML; MG/ML
1-0.05 LOTION TOPICAL TWICE DAILY
Qty: 1 | Refills: 3 | Status: ACTIVE | COMMUNITY
Start: 2025-01-22 | End: 1900-01-01

## 2025-02-19 RX ORDER — KETOCONAZOLE 20 MG/G
2 CREAM TOPICAL TWICE DAILY
Qty: 1 | Refills: 3 | Status: ACTIVE | COMMUNITY
Start: 2025-02-19 | End: 1900-01-01

## 2025-03-05 RX ORDER — TESTOSTERONE ENANTHATE 200 MG/ML
200 INJECTION, SOLUTION INTRAMUSCULAR
Qty: 5 | Refills: 0 | Status: ACTIVE | COMMUNITY
Start: 2025-03-05 | End: 1900-01-01

## 2025-05-28 RX ORDER — ZOLPIDEM TARTRATE 10 MG/1
10 TABLET ORAL
Qty: 30 | Refills: 0 | Status: ACTIVE | COMMUNITY
Start: 2025-05-28 | End: 1900-01-01

## 2025-09-20 RX ORDER — EMTRICITABINE AND TENOFOVIR ALAFENAMIDE 200; 25 MG/1; MG/1
200-25 TABLET ORAL
Qty: 30 | Refills: 0 | Status: ACTIVE | COMMUNITY
Start: 2025-09-20 | End: 1900-01-01